# Patient Record
Sex: MALE | Race: WHITE | Employment: STUDENT | URBAN - METROPOLITAN AREA
[De-identification: names, ages, dates, MRNs, and addresses within clinical notes are randomized per-mention and may not be internally consistent; named-entity substitution may affect disease eponyms.]

---

## 2018-03-23 PROBLEM — N52.9 ED (ERECTILE DYSFUNCTION): Status: ACTIVE | Noted: 2018-03-23

## 2018-07-18 ENCOUNTER — OFFICE VISIT (OUTPATIENT)
Dept: FAMILY MEDICINE CLINIC | Age: 18
End: 2018-07-18
Payer: COMMERCIAL

## 2018-07-18 VITALS
RESPIRATION RATE: 16 BRPM | DIASTOLIC BLOOD PRESSURE: 55 MMHG | HEIGHT: 68 IN | OXYGEN SATURATION: 97 % | SYSTOLIC BLOOD PRESSURE: 103 MMHG | HEART RATE: 59 BPM | WEIGHT: 171.8 LBS | BODY MASS INDEX: 26.04 KG/M2

## 2018-07-18 DIAGNOSIS — J06.9 VIRAL URI: ICD-10-CM

## 2018-07-18 DIAGNOSIS — F41.9 ANXIETY: ICD-10-CM

## 2018-07-18 DIAGNOSIS — S46.911A STRAIN OF RIGHT SHOULDER, INITIAL ENCOUNTER: ICD-10-CM

## 2018-07-18 DIAGNOSIS — Z76.89 ESTABLISHING CARE WITH NEW DOCTOR, ENCOUNTER FOR: Primary | ICD-10-CM

## 2018-07-18 DIAGNOSIS — Z00.00 VISIT FOR WELL MAN HEALTH CHECK: ICD-10-CM

## 2018-07-18 DIAGNOSIS — J30.2 CHRONIC SEASONAL ALLERGIC RHINITIS, UNSPECIFIED TRIGGER: ICD-10-CM

## 2018-07-18 DIAGNOSIS — K90.0 CELIAC DISEASE: ICD-10-CM

## 2018-07-18 PROBLEM — N52.9 ED (ERECTILE DYSFUNCTION): Status: RESOLVED | Noted: 2018-03-23 | Resolved: 2018-07-18

## 2018-07-18 PROCEDURE — 99385 PREV VISIT NEW AGE 18-39: CPT | Performed by: FAMILY MEDICINE

## 2018-07-18 PROCEDURE — 99203 OFFICE O/P NEW LOW 30 MIN: CPT | Performed by: FAMILY MEDICINE

## 2018-07-18 ASSESSMENT — ENCOUNTER SYMPTOMS
SORE THROAT: 0
WHEEZING: 0
COUGH: 1
BLOOD IN STOOL: 0
EYE PAIN: 0
SHORTNESS OF BREATH: 0

## 2018-07-18 ASSESSMENT — PATIENT HEALTH QUESTIONNAIRE - PHQ9
2. FEELING DOWN, DEPRESSED OR HOPELESS: 0
1. LITTLE INTEREST OR PLEASURE IN DOING THINGS: 0
SUM OF ALL RESPONSES TO PHQ QUESTIONS 1-9: 0
SUM OF ALL RESPONSES TO PHQ9 QUESTIONS 1 & 2: 0

## 2018-07-18 NOTE — PROGRESS NOTES
Visit Information    Have you changed or started any medications since your last visit including any over-the-counter medicines, vitamins, or herbal medicines? no   Are you having any side effects from any of your medications? -  no  Have you stopped taking any of your medications? Is so, why? -  no    Have you seen any other physician or provider since your last visit? No  Have you had any other diagnostic tests since your last visit? No  Have you been seen in the emergency room and/or had an admission to a hospital since we last saw you? No  Have you had your routine dental cleaning in the past 6 months? no    Have you activated your [employer] account? If not, what are your barriers?  No: na     Patient Care Team:  Wei Flores MD as PCP - General Lindsay Brar MD as Consulting Physician (Urology)    Medical History Review  Past Medical, Family, and Social History reviewed and does not contribute to the patient presenting condition    Health Maintenance   Topic Date Due    HIV screen  12/27/2013    Meningococcal (MCV) Vaccine Age 0-22 Years (1 of 1) 12/27/2014    DTaP/Tdap/Td vaccine (1 - Tdap) 12/27/2017    Flu vaccine (1) 09/01/2018

## 2018-07-18 NOTE — PROGRESS NOTES
Lotus Helm MD  [facility]  [address]  [address]  Dept: [phone]    Elisha Barrow is a 21 y.o. male who presents today for his medical conditions/complaints as noted below. Elisha Barrow is here today c/o Shoulder Pain (right, repeatedly lifts weights); Congestion (x 5 days); and Cough       HPI:     Here to establish care and for well check  Studying premed at [employer]  In a relationship with girlfriend, sexually active    Right shoulder pain, does weight lifting for past decade  This past year feels feels clicking and R shoulder pain after chest exercises (mainly on bench pressing), pain lasts few hours to a day, aching pain, no dramatic increase of recent exercises, no restriction in ROM, Has had his friend watch his form, tried taking a break for 1-2 weeks at a time over the last year without improvement, currently not in any pain, last benchpressed yesterday    Cough   This is a new problem. The current episode started in the past 7 days. The problem has been waxing and waning. The cough is productive of blood-tinged sputum and productive of purulent sputum. Associated symptoms include myalgias, nasal congestion and postnasal drip. Pertinent negatives include no chest pain, ear congestion, ear pain, fever, headaches, rash, sore throat, shortness of breath or wheezing. He has tried nothing for the symptoms. His past medical history is significant for environmental allergies. There is no history of asthma or COPD.    Five-day history of cough and congestion  Throat feels scratchy, no pain  No fevers    History of celiac disease, diagnosed at age 6, no blood in stools or diarrhea, weight stable, follows strict gluten diet  Anxiety, in remission, not on any meds for his mood  Allergies, sx well controlled, not interested in allergy medication    Patient Active Problem List   Diagnosis    Celiac disease    Anxiety    Chronic Lymphadenopathy:     He has no cervical adenopathy. Neurological: He is alert and oriented to person, place, and time. Skin: Skin is warm. No rash noted. He is not diaphoretic. Psychiatric: He has a normal mood and affect. His behavior is normal. Judgment and thought content normal.       Assessment & Plan:      1. Establishing care with new doctor, encounter for    2. Visit for well man health check  Recommended healthy diet / daily exercise   Recommended bi-annual dental exam / yearly vision exam   Discussed STD prevention, avoiding media violence, limiting TV time, importance of helmets and seat belts, encouraging reading  Call with concerns  Discussed Adacel, patient declined    3. Viral URI  Recommended rest, hydration, warm salt water gargles  Can try OTC cepacol lozenges or chloraseptic sore throat spray for comfort  Follow up if sx don't improve or worsen    4. Celiac disease  Continue gluten-free diet    5. Anxiety  In remission, referral to behavioral discussed, patient declined    6. Chronic seasonal allergic rhinitis, unspecified trigger  Recommended sinus rinse, offered allergy medication, patient declined    7. Strain of right shoulder, initial encounter  Exam unremarkable today. Likely strain. Recommended having a gym staff members check his form while doing his weightlifting routine. Discussed avoiding rapid increase in weights. Discussed physical therapy. Offered referral to sports medicine specialist, patient declined. - [address]    Call or return to clinic prn if these symptoms worsen or fail to improve as anticipated. I have reviewed the instructions with the patient, answering all questions to their satisfaction.     Electronically signed by Karina Bhatia MD on 7/18/2018 at 11:08 AM

## 2019-03-26 ENCOUNTER — HOSPITAL ENCOUNTER (OUTPATIENT)
Age: 19
Setting detail: SPECIMEN
Discharge: HOME | End: 2019-03-26
Payer: COMMERCIAL

## 2019-03-26 ENCOUNTER — OFFICE VISIT (OUTPATIENT)
Dept: FAMILY MEDICINE CLINIC | Age: 19
End: 2019-03-26
Payer: COMMERCIAL

## 2019-03-26 ENCOUNTER — HOSPITAL ENCOUNTER (OUTPATIENT)
Age: 19
Discharge: HOME | End: 2019-03-26
Payer: COMMERCIAL

## 2019-03-26 VITALS
OXYGEN SATURATION: 96 % | HEIGHT: 68 IN | WEIGHT: 172.6 LBS | RESPIRATION RATE: 16 BRPM | DIASTOLIC BLOOD PRESSURE: 82 MMHG | SYSTOLIC BLOOD PRESSURE: 130 MMHG | HEART RATE: 72 BPM | BODY MASS INDEX: 26.16 KG/M2

## 2019-03-26 DIAGNOSIS — Z20.2 EXPOSURE TO STD: ICD-10-CM

## 2019-03-26 DIAGNOSIS — J02.9 SORE THROAT: Primary | ICD-10-CM

## 2019-03-26 DIAGNOSIS — B37.0 THRUSH, ORAL: ICD-10-CM

## 2019-03-26 LAB
HEPATITIS B SURFACE ANTIGEN: NONREACTIVE
HEPATITIS C ANTIBODY: NONREACTIVE
HIV AG/AB: NONREACTIVE
S PYO AG THROAT QL: NORMAL
T. PALLIDUM, IGG: NONREACTIVE

## 2019-03-26 PROCEDURE — 36415 COLL VENOUS BLD VENIPUNCTURE: CPT

## 2019-03-26 PROCEDURE — 87880 STREP A ASSAY W/OPTIC: CPT | Performed by: FAMILY MEDICINE

## 2019-03-26 PROCEDURE — 86695 HERPES SIMPLEX TYPE 1 TEST: CPT

## 2019-03-26 PROCEDURE — 86780 TREPONEMA PALLIDUM: CPT

## 2019-03-26 PROCEDURE — 86694 HERPES SIMPLEX NES ANTBDY: CPT

## 2019-03-26 PROCEDURE — 86803 HEPATITIS C AB TEST: CPT

## 2019-03-26 PROCEDURE — 87389 HIV-1 AG W/HIV-1&-2 AB AG IA: CPT

## 2019-03-26 PROCEDURE — 99213 OFFICE O/P EST LOW 20 MIN: CPT | Performed by: FAMILY MEDICINE

## 2019-03-26 PROCEDURE — 87340 HEPATITIS B SURFACE AG IA: CPT

## 2019-03-26 PROCEDURE — 86696 HERPES SIMPLEX TYPE 2 TEST: CPT

## 2019-03-26 ASSESSMENT — ENCOUNTER SYMPTOMS
SHORTNESS OF BREATH: 0
CHANGE IN BOWEL HABIT: 0
VOMITING: 0
SORE THROAT: 1
SWOLLEN GLANDS: 0
COUGH: 0

## 2019-03-26 ASSESSMENT — PATIENT HEALTH QUESTIONNAIRE - PHQ9
SUM OF ALL RESPONSES TO PHQ QUESTIONS 1-9: 0
1. LITTLE INTEREST OR PLEASURE IN DOING THINGS: 0
2. FEELING DOWN, DEPRESSED OR HOPELESS: 0
SUM OF ALL RESPONSES TO PHQ9 QUESTIONS 1 & 2: 0
SUM OF ALL RESPONSES TO PHQ QUESTIONS 1-9: 0

## 2019-03-28 ENCOUNTER — TELEPHONE (OUTPATIENT)
Dept: FAMILY MEDICINE CLINIC | Age: 19
End: 2019-03-28

## 2019-03-28 DIAGNOSIS — B00.9 HSV INFECTION: Primary | ICD-10-CM

## 2019-03-28 DIAGNOSIS — K90.0 CELIAC DISEASE: Primary | ICD-10-CM

## 2019-03-28 DIAGNOSIS — R63.4 WEIGHT LOSS: ICD-10-CM

## 2019-03-28 LAB
CULTURE: NORMAL
CULTURE: NORMAL
HERPES SIMPLEX VIRUS 1 IGG: 0.4
HERPES SIMPLEX VIRUS 2 IGG: 0.27
HERPES TYPE 1/2 IGM COMBINED: 3.77
Lab: NORMAL
SPECIMEN DESCRIPTION: NORMAL

## 2019-03-28 RX ORDER — VALACYCLOVIR HYDROCHLORIDE 1 G/1
1000 TABLET, FILM COATED ORAL 2 TIMES DAILY
Qty: 14 TABLET | Refills: 0 | Status: SHIPPED | OUTPATIENT
Start: 2019-03-28 | End: 2019-04-04

## 2019-03-29 DIAGNOSIS — B00.9 HSV INFECTION: Primary | ICD-10-CM

## 2019-04-19 ENCOUNTER — HOSPITAL ENCOUNTER (OUTPATIENT)
Age: 19
Discharge: HOME | End: 2019-04-19
Payer: COMMERCIAL

## 2019-04-19 DIAGNOSIS — R63.4 WEIGHT LOSS: ICD-10-CM

## 2019-04-19 DIAGNOSIS — B00.9 HSV INFECTION: ICD-10-CM

## 2019-04-19 DIAGNOSIS — K90.0 CELIAC DISEASE: ICD-10-CM

## 2019-04-19 LAB
ABSOLUTE EOS #: 0.08 K/UL (ref 0–0.44)
ABSOLUTE IMMATURE GRANULOCYTE: <0.03 K/UL (ref 0–0.3)
ABSOLUTE LYMPH #: 1.39 K/UL (ref 1.2–5.2)
ABSOLUTE MONO #: 0.39 K/UL (ref 0.1–1.4)
ANION GAP SERPL CALCULATED.3IONS-SCNC: 13 MMOL/L (ref 9–17)
BASOPHILS # BLD: 1 % (ref 0–2)
BASOPHILS ABSOLUTE: 0.03 K/UL (ref 0–0.2)
BUN BLDV-MCNC: 13 MG/DL (ref 6–20)
BUN/CREAT BLD: ABNORMAL (ref 9–20)
CALCIUM SERPL-MCNC: 9.3 MG/DL (ref 8.6–10.4)
CHLORIDE BLD-SCNC: 99 MMOL/L (ref 98–107)
CHOLESTEROL/HDL RATIO: 2.3
CHOLESTEROL: 128 MG/DL
CO2: 25 MMOL/L (ref 20–31)
CREAT SERPL-MCNC: 0.88 MG/DL (ref 0.7–1.2)
DIFFERENTIAL TYPE: ABNORMAL
EOSINOPHILS RELATIVE PERCENT: 1 % (ref 1–4)
GFR AFRICAN AMERICAN: >60 ML/MIN
GFR NON-AFRICAN AMERICAN: >60 ML/MIN
GFR SERPL CREATININE-BSD FRML MDRD: ABNORMAL ML/MIN/{1.73_M2}
GFR SERPL CREATININE-BSD FRML MDRD: ABNORMAL ML/MIN/{1.73_M2}
GLUCOSE BLD-MCNC: 142 MG/DL (ref 70–99)
HCT VFR BLD CALC: 44.5 % (ref 40.7–50.3)
HDLC SERPL-MCNC: 55 MG/DL
HEMOGLOBIN: 14.7 G/DL (ref 13–17)
IMMATURE GRANULOCYTES: 0 %
LDL CHOLESTEROL: 67 MG/DL (ref 0–130)
LYMPHOCYTES # BLD: 21 % (ref 25–45)
MCH RBC QN AUTO: 30.7 PG (ref 25.2–33.5)
MCHC RBC AUTO-ENTMCNC: 33 G/DL (ref 28.4–34.8)
MCV RBC AUTO: 92.9 FL (ref 82.6–102.9)
MONOCYTES # BLD: 6 % (ref 2–8)
NRBC AUTOMATED: 0 PER 100 WBC
PDW BLD-RTO: 12.3 % (ref 11.8–14.4)
PLATELET # BLD: 243 K/UL (ref 138–453)
PLATELET ESTIMATE: ABNORMAL
PMV BLD AUTO: 11.5 FL (ref 8.1–13.5)
POTASSIUM SERPL-SCNC: 3.6 MMOL/L (ref 3.7–5.3)
RBC # BLD: 4.79 M/UL (ref 4.21–5.77)
RBC # BLD: ABNORMAL 10*6/UL
SEG NEUTROPHILS: 71 % (ref 34–64)
SEGMENTED NEUTROPHILS ABSOLUTE COUNT: 4.74 K/UL (ref 1.8–8)
SODIUM BLD-SCNC: 137 MMOL/L (ref 135–144)
TRIGL SERPL-MCNC: 31 MG/DL
TSH SERPL DL<=0.05 MIU/L-ACNC: 1.34 MIU/L (ref 0.3–5)
VITAMIN B-12: 745 PG/ML (ref 232–1245)
VLDLC SERPL CALC-MCNC: NORMAL MG/DL (ref 1–30)
WBC # BLD: 6.7 K/UL (ref 4.5–13.5)
WBC # BLD: ABNORMAL 10*3/UL

## 2019-04-19 PROCEDURE — 86696 HERPES SIMPLEX TYPE 2 TEST: CPT

## 2019-04-19 PROCEDURE — 80048 BASIC METABOLIC PNL TOTAL CA: CPT

## 2019-04-19 PROCEDURE — 83036 HEMOGLOBIN GLYCOSYLATED A1C: CPT

## 2019-04-19 PROCEDURE — 86694 HERPES SIMPLEX NES ANTBDY: CPT

## 2019-04-19 PROCEDURE — 86695 HERPES SIMPLEX TYPE 1 TEST: CPT

## 2019-04-19 PROCEDURE — 82607 VITAMIN B-12: CPT

## 2019-04-19 PROCEDURE — 85025 COMPLETE CBC W/AUTO DIFF WBC: CPT

## 2019-04-19 PROCEDURE — 82728 ASSAY OF FERRITIN: CPT

## 2019-04-19 PROCEDURE — 80061 LIPID PANEL: CPT

## 2019-04-19 PROCEDURE — 84443 ASSAY THYROID STIM HORMONE: CPT

## 2019-04-19 PROCEDURE — 36415 COLL VENOUS BLD VENIPUNCTURE: CPT

## 2019-04-20 LAB — FERRITIN: 66 UG/L (ref 30–400)

## 2019-04-21 LAB
ESTIMATED AVERAGE GLUCOSE: 85 MG/DL
HBA1C MFR BLD: 4.6 % (ref 4–6)

## 2019-04-22 ENCOUNTER — OFFICE VISIT (OUTPATIENT)
Dept: FAMILY MEDICINE CLINIC | Age: 19
End: 2019-04-22
Payer: COMMERCIAL

## 2019-04-22 ENCOUNTER — HOSPITAL ENCOUNTER (OUTPATIENT)
Age: 19
Setting detail: SPECIMEN
Discharge: HOME | End: 2019-04-22
Payer: COMMERCIAL

## 2019-04-22 VITALS
HEART RATE: 66 BPM | RESPIRATION RATE: 16 BRPM | WEIGHT: 171 LBS | DIASTOLIC BLOOD PRESSURE: 73 MMHG | BODY MASS INDEX: 25.91 KG/M2 | HEIGHT: 68 IN | SYSTOLIC BLOOD PRESSURE: 122 MMHG | OXYGEN SATURATION: 96 %

## 2019-04-22 DIAGNOSIS — Z11.3 SCREEN FOR STD (SEXUALLY TRANSMITTED DISEASE): ICD-10-CM

## 2019-04-22 DIAGNOSIS — R39.89 POSSIBLE URINARY TRACT INFECTION: Primary | ICD-10-CM

## 2019-04-22 LAB
BILIRUBIN, POC: NEGATIVE
BLOOD URINE, POC: NEGATIVE
CLARITY, POC: NORMAL
COLOR, POC: YELLOW
GLUCOSE URINE, POC: NEGATIVE
KETONES, POC: NEGATIVE
LEUKOCYTE EST, POC: NEGATIVE
NITRITE, POC: NEGATIVE
PH, POC: 5
PROTEIN, POC: NEGATIVE
SPECIFIC GRAVITY, POC: 1.02
UROBILINOGEN, POC: 0.2

## 2019-04-22 PROCEDURE — 99213 OFFICE O/P EST LOW 20 MIN: CPT | Performed by: FAMILY MEDICINE

## 2019-04-22 PROCEDURE — G8419 CALC BMI OUT NRM PARAM NOF/U: HCPCS | Performed by: FAMILY MEDICINE

## 2019-04-22 PROCEDURE — 81003 URINALYSIS AUTO W/O SCOPE: CPT | Performed by: FAMILY MEDICINE

## 2019-04-22 PROCEDURE — G8427 DOCREV CUR MEDS BY ELIG CLIN: HCPCS | Performed by: FAMILY MEDICINE

## 2019-04-22 PROCEDURE — 1036F TOBACCO NON-USER: CPT | Performed by: FAMILY MEDICINE

## 2019-04-22 ASSESSMENT — ENCOUNTER SYMPTOMS: SHORTNESS OF BREATH: 0

## 2019-04-22 NOTE — PROGRESS NOTES
MD Eddi Page [facility]ILY MEDICINE  [facility] [address] [facility] [address]  Dept: [phone]    Fuad Lopez is a 19 y.o. male who presents today for hismedical conditions/complaints as noted below. Fuad Lopez is here today c/o Other (urinary burning, redness tip of penis)       HPI:     HPI    Treated at [facility] with ceftriaxone and azithromycin 3 weeks ago for suspected STD as he was having urinary burning, burning has slowly been improving, he has not been sexually active since the last time he was treated for an STD prior to this  Very anxious since lab testing showed positive herpes IgM, he has been seen at [facility], the ER, and walk-in since being told this diagnosis, no lesions that he can actively see, he feels like there is Jm Confer ticking time bomb\" for when the lesions will start, complete the course of Valtrex that was prescribed  Has never before seen any herpes lesions, no fevers, otherwise feels well  Wants to be tested for UTI    Patient Active Problem List   Diagnosis    Celiac disease    Anxiety    Chronic seasonal allergic rhinitis    Strain of right shoulder       Past Medical History:   Diagnosis Date    Hand fracture November 2014    right     No past surgical history on file. No family history on file. Social History     Tobacco Use    Smoking status: Never Smoker    Smokeless tobacco: Never Used   Substance Use Topics    Alcohol use: Yes    Drug use: No     No current outpatient medications on file. Subjective:     Review of Systems   Constitutional: Negative for fever. Respiratory: Negative for shortness of breath. Cardiovascular: Negative for chest pain. Genitourinary: Positive for dysuria. Negative for decreased urine volume, difficulty urinating, flank pain, frequency, genital sores, hematuria, penile pain, penile swelling, testicular pain and urgency.        Objective:     /73 Pulse 66   Resp 16   Ht 5' 7.99\" (1.727 m)   Wt 171 lb (77.6 kg)   SpO2 96%   BMI 26.01 kg/m²     Physical Exam   Constitutional: He appears well-developed. HENT:   Head: Normocephalic. Mouth/Throat: No oropharyngeal exudate. Eyes: EOM are normal. Right eye exhibits no discharge. Left eye exhibits no discharge. No scleral icterus. Pulmonary/Chest: Effort normal. No respiratory distress. Genitourinary:   Genitourinary Comments: No lesions at the pubic area. Barely noticeable trace of red at the urinary meatus, nontender to touch, no discharge. Neurological: He is alert. Skin: He is not diaphoretic. Psychiatric: He has a normal mood and affect. His behavior is normal. Judgment and thought content normal.       Assessment & Plan:      1. Possible urinary tract infection  Urinalysis unremarkable, urine sent for culture. Repeat testing for the herpes profile ordered, results not back yet. Reassured him that his exam appears unremarkable. Offered referral to urology as he is concerned about the barely noticeable trace of red at the tip of the penis, he declined. Should any of the testing come back positive, we will let him know. - POCT Urinalysis No Micro (Auto)  - Urine Culture    2. Screen for STD (sexually transmitted disease)  - C.trachomatis N.gonorrhoeae DNA; Future      Call or return to clinic prn if these symptoms worsen or fail to improve as anticipated. I have reviewed the instructions with the patient, answering all questions to their satisfaction.     Electronically signed by Rusty Syed MD on 4/22/2019 at 11:42 AM

## 2019-04-22 NOTE — PROGRESS NOTES
Visit Information    Have you changed or started any medications since your last visit including any over-the-counter medicines, vitamins, or herbal medicines? no   Are you having any side effects from any of your medications? -  no  Have you stopped taking any of your medications? Is so, why? -  no    Have you seen any other physician or provider since your last visit? No  Have you had any other diagnostic tests since your last visit? No  Have you been seen in the emergency room and/or had an admission to a hospital since we last saw you? No  Have you had your routine dental cleaning in the past 6 months? no    Have you activated your [employer] account? If not, what are your barriers?  Yes     Patient Care Team:  Buddy Boswell MD as PCP - General (Family Medicine)  Coty Vallejo MD as Consulting Physician (Urology)    Medical History Review  Past Medical, Family, and Social History reviewed and does not contribute to the patient presenting condition    Health Maintenance   Topic Date Due    Varicella Vaccine (1 of 2 - 13+ 2-dose series) 12/27/2011    HPV vaccine (1 - Male 3-dose series) 12/27/2013    DTaP/Tdap/Td vaccine (1 - Tdap) 12/27/2017    Flu vaccine (Season Ended) 09/01/2019    HIV screen  Completed    Meningococcal (ACWY) Vaccine  Aged Out    Pneumococcal 0-64 years Vaccine  Aged Out

## 2019-04-23 LAB
C. TRACHOMATIS DNA ,URINE: NEGATIVE
CULTURE: NO GROWTH
HERPES SIMPLEX VIRUS 1 IGG: 0.35
HERPES SIMPLEX VIRUS 2 IGG: 0.22
HERPES TYPE 1/2 IGM COMBINED: 2.94
Lab: NORMAL
N. GONORRHOEAE DNA, URINE: NEGATIVE
SPECIMEN DESCRIPTION: NORMAL
SPECIMEN DESCRIPTION: NORMAL

## 2019-06-18 ENCOUNTER — TELEPHONE (OUTPATIENT)
Dept: FAMILY MEDICINE CLINIC | Age: 19
End: 2019-06-18

## 2019-06-18 ENCOUNTER — HOSPITAL ENCOUNTER (OUTPATIENT)
Age: 19
Setting detail: SPECIMEN
Discharge: HOME | End: 2019-06-18
Payer: COMMERCIAL

## 2019-06-18 ENCOUNTER — OFFICE VISIT (OUTPATIENT)
Dept: FAMILY MEDICINE CLINIC | Age: 19
End: 2019-06-18
Payer: COMMERCIAL

## 2019-06-18 VITALS
WEIGHT: 171.2 LBS | HEIGHT: 71 IN | DIASTOLIC BLOOD PRESSURE: 68 MMHG | SYSTOLIC BLOOD PRESSURE: 126 MMHG | RESPIRATION RATE: 16 BRPM | OXYGEN SATURATION: 94 % | BODY MASS INDEX: 23.97 KG/M2 | HEART RATE: 64 BPM

## 2019-06-18 DIAGNOSIS — B37.0 THRUSH, ORAL: ICD-10-CM

## 2019-06-18 DIAGNOSIS — Z11.3 SCREEN FOR STD (SEXUALLY TRANSMITTED DISEASE): ICD-10-CM

## 2019-06-18 DIAGNOSIS — F41.9 ANXIETY: Primary | ICD-10-CM

## 2019-06-18 LAB
HEPATITIS B SURFACE ANTIGEN: NONREACTIVE
HEPATITIS C ANTIBODY: NONREACTIVE
HIV AG/AB: NONREACTIVE
T. PALLIDUM, IGG: NONREACTIVE

## 2019-06-18 PROCEDURE — G8420 CALC BMI NORM PARAMETERS: HCPCS | Performed by: FAMILY MEDICINE

## 2019-06-18 PROCEDURE — G8427 DOCREV CUR MEDS BY ELIG CLIN: HCPCS | Performed by: FAMILY MEDICINE

## 2019-06-18 PROCEDURE — 99214 OFFICE O/P EST MOD 30 MIN: CPT | Performed by: FAMILY MEDICINE

## 2019-06-18 PROCEDURE — 1036F TOBACCO NON-USER: CPT | Performed by: FAMILY MEDICINE

## 2019-06-18 RX ORDER — FLUCONAZOLE 150 MG/1
150 TABLET ORAL ONCE
Qty: 1 TABLET | Refills: 0 | Status: SHIPPED | OUTPATIENT
Start: 2019-06-18 | End: 2019-06-18

## 2019-06-18 ASSESSMENT — ENCOUNTER SYMPTOMS: SHORTNESS OF BREATH: 0

## 2019-06-18 NOTE — PROGRESS NOTES
MD Eddi Edwards 53 FAMILY [facility] [address] [facility] [address]  Dept: [phone]    Gen Portillo is a 19 y.o. male who presents today for hismedical conditions/complaints as noted below. Gen Portillo is here today c/o Thrush       HPI:     HPI    Here for three-month STD check, he is very worried about potential for having contracted HIV, he has not had intercourse since the last time he had sex unprotected over 3 months ago, no abnormal penile discharge or burning, no abnormal fevers or rash  Still has oral thrush, stopped using the nystatin and so it never went away  He has been fixated about the HIV on a daily basis, any rash he seems to get he worries excessively that this could be related to HIV, only has intercourse with female partners, has never had intercourse with male partners  Describes himself as an anxious individual, lately has been feeling foggy minded, decreased concentration, low energy  Has never been on medications for his mood, denies feeling depressed, no SI    Patient Active Problem List   Diagnosis    Celiac disease    Anxiety    Chronic seasonal allergic rhinitis    Strain of right shoulder       Past Medical History:   Diagnosis Date    Hand fracture November 2014    right     No past surgical history on file. No family history on file. Social History     Tobacco Use    Smoking status: Never Smoker    Smokeless tobacco: Never Used   Substance Use Topics    Alcohol use:  Yes    Drug use: No       Current Outpatient Medications:     fluconazole (DIFLUCAN) 150 MG tablet, Take 1 tablet by mouth once for 1 dose, Disp: 1 tablet, Rfl: 0    nystatin (MYCOSTATIN) 672380 UNIT/ML suspension, Take 5 mLs by mouth 4 times daily for 14 days Swish in the mouth and retain for as long as possible (several minutes) before swallowing, Disp: 1 Bottle, Rfl: 1    Subjective:     Review of Systems   Constitutional: Negative for fever and unexpected weight change. Respiratory: Negative for shortness of breath. Cardiovascular: Negative for chest pain. Psychiatric/Behavioral: Negative for suicidal ideas. The patient is nervous/anxious. Objective:     /68   Pulse 64   Resp 16   Ht 5' 11\" (1.803 m)   Wt 171 lb 3.2 oz (77.7 kg)   SpO2 94%   BMI 23.88 kg/m²     Physical Exam   Constitutional: He appears well-developed. Eyes: Conjunctivae and EOM are normal.   Cardiovascular: Normal heart sounds. No murmur heard. Pulmonary/Chest: Effort normal and breath sounds normal. No respiratory distress. He has no wheezes. Neurological: He is alert. Skin: He is not diaphoretic. Psychiatric: He has a normal mood and affect. His behavior is normal. Judgment and thought content normal.       Assessment & Plan:      1. Thrush, oral  - fluconazole (DIFLUCAN) 150 MG tablet; Take 1 tablet by mouth once for 1 dose  Dispense: 1 tablet; Refill: 0  - nystatin (MYCOSTATIN) 551207 UNIT/ML suspension; Take 5 mLs by mouth 4 times daily for 14 days Swish in the mouth and retain for as long as possible (several minutes) before swallowing  Dispense: 1 Bottle; Refill: 1    2. Screen for STD (sexually transmitted disease)  - Hepatitis B Surface Antigen; Future  - Hepatitis C Antibody; Future  - HIV Screen; Future  - T. pallidum Ab; Future  - C.trachomatis N.gonorrhoeae DNA, Urine; Future    3. Anxiety  Had a long discussion about treatments for his anxiety. For now he declined counseling, he wishes to think more about medications but is not interested in starting right now. Discussed importance of stress management, self-care. Follow-up in 1 month to micaela. 25 mins spent face to face with the pt, greater than 50% of the time was spent counseling the pt and coordinating care regarding anxiety. Call or return to clinic prn if these symptoms worsen or fail to improve as anticipated.   I have reviewed the instructions with the patient, answering all questions to their satisfaction.     Electronically signed by Karina Mcwilliams MD on 6/18/2019 at 5:27 PM

## 2019-06-20 LAB
C. TRACHOMATIS DNA ,URINE: NEGATIVE
N. GONORRHOEAE DNA, URINE: NEGATIVE
SPECIMEN DESCRIPTION: NORMAL

## 2019-07-11 DIAGNOSIS — B37.0 ORAL THRUSH: Primary | ICD-10-CM

## 2019-07-12 ENCOUNTER — OFFICE VISIT (OUTPATIENT)
Dept: DERMATOLOGY | Age: 19
End: 2019-07-12
Payer: COMMERCIAL

## 2019-07-12 VITALS
HEIGHT: 70 IN | DIASTOLIC BLOOD PRESSURE: 70 MMHG | HEART RATE: 55 BPM | BODY MASS INDEX: 25.05 KG/M2 | SYSTOLIC BLOOD PRESSURE: 119 MMHG | WEIGHT: 175 LBS

## 2019-07-12 DIAGNOSIS — K14.3 TONGUE COATING: Primary | ICD-10-CM

## 2019-07-12 PROCEDURE — 99202 OFFICE O/P NEW SF 15 MIN: CPT | Performed by: DERMATOLOGY

## 2019-07-12 PROCEDURE — G8419 CALC BMI OUT NRM PARAM NOF/U: HCPCS | Performed by: DERMATOLOGY

## 2019-07-12 PROCEDURE — 1036F TOBACCO NON-USER: CPT | Performed by: DERMATOLOGY

## 2019-07-12 PROCEDURE — G8427 DOCREV CUR MEDS BY ELIG CLIN: HCPCS | Performed by: DERMATOLOGY

## 2019-07-12 RX ORDER — FLUCONAZOLE 150 MG/1
TABLET ORAL
COMMUNITY
Start: 2019-06-18 | End: 2021-05-20

## 2019-07-12 NOTE — PROGRESS NOTES
Alert and oriented to person, place and time  Psych: Normal affect   Lymph Node: Not performed    Cutaneous Exam: Performed as documented in clinic note below. Sun-exposed skin, which includes the head/face, neck, both arms, digits and/or nails was examined. Also examined oral mucosa    Pertinent Physical Exam Findings:  Physical Exam  Posterior tongue with physiologic yellow-white coating    Photo surveillance performed: No    Medical Necessity of Exam Performed:   Distribution of patient concerns    Additional Diagnostic Testing performed during exam: JEREMY ,  Negative    ASSESSMENT:   Diagnosis Orders   1. Tongue coating         Plan of Action is as Follows:  Assessment   1. Tongue coating  - KOH negative  - explained to patient that his tongue coating is normal -- if he dislikes it can brush tongue or use tongue scraper (patient's strong gag reflex may have deterred him from doing this)    RTC prn            Patient Instructions   Follow up as needed      Follow-up: No follow-ups on file. This note was created with the assistance of a speech-recognition program.  Although the intention is to generate a document that actually reflects the content of the visit, no guarantees can be provided that every mistake has been identified and corrected byediting.     Electronically signed by Luana Jha MD on 7/12/19 at 2:53 PM

## 2019-10-10 ENCOUNTER — TELEPHONE (OUTPATIENT)
Dept: FAMILY MEDICINE CLINIC | Age: 19
End: 2019-10-10

## 2019-12-13 LAB
C. TRACHOMATIS DNA ,URINE: NEGATIVE
N. GONORRHOEAE DNA, URINE: NEGATIVE

## 2021-05-20 ENCOUNTER — OFFICE VISIT (OUTPATIENT)
Dept: FAMILY MEDICINE CLINIC | Age: 21
End: 2021-05-20
Payer: COMMERCIAL

## 2021-05-20 ENCOUNTER — HOSPITAL ENCOUNTER (OUTPATIENT)
Age: 21
Setting detail: SPECIMEN
Discharge: HOME | End: 2021-05-20
Payer: COMMERCIAL

## 2021-05-20 VITALS
RESPIRATION RATE: 16 BRPM | SYSTOLIC BLOOD PRESSURE: 115 MMHG | OXYGEN SATURATION: 97 % | WEIGHT: 168.6 LBS | DIASTOLIC BLOOD PRESSURE: 69 MMHG | HEIGHT: 70 IN | BODY MASS INDEX: 24.14 KG/M2 | HEART RATE: 61 BPM

## 2021-05-20 DIAGNOSIS — R79.89 CREATININE ELEVATION: ICD-10-CM

## 2021-05-20 DIAGNOSIS — R82.998 FOAMY URINE: Primary | ICD-10-CM

## 2021-05-20 LAB
-: NORMAL
AMORPHOUS: NORMAL
ANION GAP SERPL CALCULATED.3IONS-SCNC: 8 MMOL/L (ref 9–17)
BACTERIA: NORMAL
BILIRUBIN URINE: NEGATIVE
BUN BLDV-MCNC: 14 MG/DL (ref 6–20)
BUN/CREAT BLD: ABNORMAL (ref 9–20)
CALCIUM SERPL-MCNC: 9.9 MG/DL (ref 8.6–10.4)
CASTS UA: NORMAL /LPF (ref 0–8)
CHLORIDE BLD-SCNC: 99 MMOL/L (ref 98–107)
CO2: 30 MMOL/L (ref 20–31)
COLOR: YELLOW
CREAT SERPL-MCNC: 0.89 MG/DL (ref 0.7–1.2)
CRYSTALS, UA: NORMAL /HPF
EPITHELIAL CELLS UA: NORMAL /HPF (ref 0–5)
GFR AFRICAN AMERICAN: >60 ML/MIN
GFR NON-AFRICAN AMERICAN: >60 ML/MIN
GFR SERPL CREATININE-BSD FRML MDRD: ABNORMAL ML/MIN/{1.73_M2}
GFR SERPL CREATININE-BSD FRML MDRD: ABNORMAL ML/MIN/{1.73_M2}
GLUCOSE BLD-MCNC: 78 MG/DL (ref 70–99)
GLUCOSE URINE: NEGATIVE
KETONES, URINE: NEGATIVE
LEUKOCYTE ESTERASE, URINE: NEGATIVE
MUCUS: NORMAL
NITRITE, URINE: NEGATIVE
OTHER OBSERVATIONS UA: NORMAL
PH UA: 8 (ref 5–8)
POTASSIUM SERPL-SCNC: 4.6 MMOL/L (ref 3.7–5.3)
PROTEIN UA: NEGATIVE
RBC UA: NORMAL /HPF (ref 0–4)
RENAL EPITHELIAL, UA: NORMAL /HPF
SODIUM BLD-SCNC: 137 MMOL/L (ref 135–144)
SPECIFIC GRAVITY UA: 1.02 (ref 1–1.03)
TRICHOMONAS: NORMAL
TURBIDITY: CLEAR
URINE HGB: NEGATIVE
UROBILINOGEN, URINE: NORMAL
WBC UA: NORMAL /HPF (ref 0–5)
YEAST: NORMAL

## 2021-05-20 PROCEDURE — G8427 DOCREV CUR MEDS BY ELIG CLIN: HCPCS | Performed by: NURSE PRACTITIONER

## 2021-05-20 PROCEDURE — 1036F TOBACCO NON-USER: CPT | Performed by: NURSE PRACTITIONER

## 2021-05-20 PROCEDURE — G8420 CALC BMI NORM PARAMETERS: HCPCS | Performed by: NURSE PRACTITIONER

## 2021-05-20 PROCEDURE — 99203 OFFICE O/P NEW LOW 30 MIN: CPT | Performed by: NURSE PRACTITIONER

## 2021-05-20 SDOH — ECONOMIC STABILITY: FOOD INSECURITY: WITHIN THE PAST 12 MONTHS, YOU WORRIED THAT YOUR FOOD WOULD RUN OUT BEFORE YOU GOT MONEY TO BUY MORE.: NEVER TRUE

## 2021-05-20 ASSESSMENT — PATIENT HEALTH QUESTIONNAIRE - PHQ9
SUM OF ALL RESPONSES TO PHQ QUESTIONS 1-9: 0
1. LITTLE INTEREST OR PLEASURE IN DOING THINGS: 0
SUM OF ALL RESPONSES TO PHQ QUESTIONS 1-9: 0

## 2021-05-20 ASSESSMENT — ENCOUNTER SYMPTOMS
SINUS PRESSURE: 0
COLOR CHANGE: 0
SHORTNESS OF BREATH: 0
CHEST TIGHTNESS: 0
SINUS PAIN: 0
SORE THROAT: 0
CONSTIPATION: 0
BACK PAIN: 0
ABDOMINAL PAIN: 0
DIARRHEA: 0
NAUSEA: 0

## 2021-05-20 ASSESSMENT — SOCIAL DETERMINANTS OF HEALTH (SDOH): HOW HARD IS IT FOR YOU TO PAY FOR THE VERY BASICS LIKE FOOD, HOUSING, MEDICAL CARE, AND HEATING?: NOT HARD AT ALL

## 2021-05-20 NOTE — PROGRESS NOTES
Maryuri Riddle is a 23 y.o. male who presents in office today with Self establish new care with office. Previous PCP was Dr Kian Berry   Patient presents with   Poncho Garnica Establish Care        History of Present Illness:     HPI    Here to establish care. Previous patient of Dr Flor Farmer and was being seen by physician near [facility]. Recently has had foamy urine. No UTI symptoms. Read that it could be sign of kidney damage. He does eat a high protein diet and had elevated creatinine in blood work earlier this year. He takes pre-workout supplement, no creatine. Wants to be careful with renal health. Previously was having burning with urination after intercourse using condom. He was seen by urology at that time and did have cystoscopy which was negative. He was given antibiotics to treat STI despite STI tests negative. Regular exercise, stays hydrated. Just graduated from [employer], degree in public health and pre-med. Applying to med school this spring. Starting non-profit. Working as scribe. Care gaps: COVID-19 vaccine: had J&J vaccine  Vaccines: HPV and varicella (2nd doses due of both)    Health Maintenance Due   Topic Date Due    Varicella vaccine (2 of 2 - 2-dose childhood series) 10/23/2009    HPV vaccine (1 - Male 2-dose series) Never done        Patient Care Team:  ANIL Moon CNP as PCP - General (Nurse Practitioner)  ANIL Moon CNP as PCP - [facility] Empaneled Provider  Camilla Cheema MD as Consulting Physician (Urology)    Reviewed     [x] Past Medical, Family, and Social History was reviewed per writer and does contribute to the patient presenting condition.     [x] Laboratory Results, Vital signs, Imaging, Active Problems, Immunizations, Current/Recently Discontinued Medications, Health Maintenance Activities Due, Referral Notes (if available) were reviewed per writer     [x] Reviewed Depression screening if taken or valid today or any other valid screening tool (others seen below) Interpretation of Total Score DepressionSeverity: 1-4 = Minimal depression, 5-9 = Mild depression, 10-14 = Moderate depression, 15-19 = Moderately severe depression, 20-27 =Severe depression    PHQ Scores 5/20/2021 3/26/2019 7/18/2018   PHQ2 Score 0 0 0   PHQ9 Score 0 0 0     Interpretation of Total Score Depression Severity: 1-4 = Minimal depression, 5-9 = Mild depression, 10-14 = Moderate depression, 15-19 = Moderately severe depression, 20-27 = Severe depression     Review of Systems (Subjective)     Review of Systems   Constitutional: Negative for activity change, appetite change and unexpected weight change. HENT: Negative for congestion, sinus pressure, sinus pain and sore throat. Respiratory: Negative for chest tightness and shortness of breath. Cardiovascular: Negative for chest pain, palpitations and leg swelling. Gastrointestinal: Negative for abdominal pain, constipation, diarrhea and nausea. Endocrine: Negative for polyuria. Genitourinary: Negative for difficulty urinating and dysuria. Foamy urine   Musculoskeletal: Negative for arthralgias, back pain and myalgias. Skin: Negative for color change and rash. Neurological: Negative for dizziness, light-headedness and headaches. Psychiatric/Behavioral: Negative for decreased concentration and dysphoric mood. The patient is not nervous/anxious. See HPI     Physical Assessment (Objective)     /69   Pulse 61   Resp 16   Ht 5' 10\" (1.778 m)   Wt 168 lb 9.6 oz (76.5 kg)   SpO2 97%   BMI 24.19 kg/m²      Physical Exam  Vitals and nursing note reviewed. Constitutional:       Appearance: Normal appearance. He is normal weight. HENT:      Head: Normocephalic and atraumatic. Right Ear: External ear normal.      Left Ear: External ear normal.      Nose: Nose normal.   Eyes:      Extraocular Movements: Extraocular movements intact.       Conjunctiva/sclera: Conjunctivae normal. Cardiovascular:      Rate and Rhythm: Normal rate and regular rhythm. Pulses: Normal pulses. Heart sounds: Normal heart sounds. No murmur heard. Pulmonary:      Effort: Pulmonary effort is normal. No respiratory distress. Breath sounds: Normal breath sounds. No wheezing. Abdominal:      General: There is no distension. Tenderness: There is no abdominal tenderness. Musculoskeletal:         General: Normal range of motion. Cervical back: Normal range of motion and neck supple. Skin:     General: Skin is warm and dry. Capillary Refill: Capillary refill takes less than 2 seconds. Neurological:      General: No focal deficit present. Mental Status: He is alert and oriented to person, place, and time. Gait: Gait normal.   Psychiatric:         Mood and Affect: Mood normal.         Behavior: Behavior normal.         Thought Content: Thought content normal.         Diagnoses / Plan:   1. Foamy urine  -     Urinalysis With Microscopic; Future  -     Basic Metabolic Panel; Future  2. Creatinine elevation  -     Urinalysis With Microscopic; Future  -     Basic Metabolic Panel; Future       Encouraged healthy diet and exercise. Call office with any new or worsening symptoms or concerns. Return in about 1 year (around 5/20/2022) for Annual Visit/Physical.            Electronically signed by ANIL Suarez CNP on 5/20/2021 at 3:43 PM    Note is dictated utilizing voice recognition software. Unfortunately this leads to occasional typographical errors. Please contact our office if you have any questions.

## 2021-05-21 ENCOUNTER — NURSE ONLY (OUTPATIENT)
Dept: FAMILY MEDICINE CLINIC | Age: 21
End: 2021-05-21
Payer: COMMERCIAL

## 2021-05-21 DIAGNOSIS — Z23 NEED FOR HPV VACCINATION: Primary | ICD-10-CM

## 2021-05-21 PROCEDURE — 90471 IMMUNIZATION ADMIN: CPT | Performed by: NURSE PRACTITIONER

## 2021-05-21 PROCEDURE — 90651 9VHPV VACCINE 2/3 DOSE IM: CPT | Performed by: NURSE PRACTITIONER

## 2023-01-26 ENCOUNTER — TELEPHONE (OUTPATIENT)
Dept: FAMILY MEDICINE CLINIC | Age: 23
End: 2023-01-26

## 2023-01-26 NOTE — TELEPHONE ENCOUNTER
Pt's mom was advised to take him to [facility]. They have testing available and will be able to treat accordingly.

## 2023-01-26 NOTE — TELEPHONE ENCOUNTER
Patients mother called in stating the patient is sick with the flu and he is taking a big test tomorrow and she wants to know if  you can give the patient a note excusing him. I did advise her that no appts available and advised her to take the patient to  to be seen.     I was advised that lyly was done with patients for the day     Please advise